# Patient Record
Sex: FEMALE | Race: WHITE
[De-identification: names, ages, dates, MRNs, and addresses within clinical notes are randomized per-mention and may not be internally consistent; named-entity substitution may affect disease eponyms.]

---

## 2018-04-18 ENCOUNTER — HOSPITAL ENCOUNTER (EMERGENCY)
Dept: HOSPITAL 91 - FTE | Age: 28
Discharge: HOME | End: 2018-04-18
Payer: SELF-PAY

## 2018-04-18 ENCOUNTER — HOSPITAL ENCOUNTER (EMERGENCY)
Age: 28
Discharge: HOME | End: 2018-04-18

## 2018-04-18 DIAGNOSIS — R05: Primary | ICD-10-CM

## 2018-04-18 PROCEDURE — 99283 EMERGENCY DEPT VISIT LOW MDM: CPT

## 2018-04-18 PROCEDURE — 71045 X-RAY EXAM CHEST 1 VIEW: CPT

## 2019-04-18 ENCOUNTER — HOSPITAL ENCOUNTER (EMERGENCY)
Dept: HOSPITAL 10 - FTE | Age: 29
Discharge: HOME | End: 2019-04-18
Payer: MEDICAID

## 2019-04-18 ENCOUNTER — HOSPITAL ENCOUNTER (EMERGENCY)
Dept: HOSPITAL 91 - FTE | Age: 29
Discharge: HOME | End: 2019-04-18
Payer: MEDICAID

## 2019-04-18 VITALS — DIASTOLIC BLOOD PRESSURE: 69 MMHG | HEART RATE: 90 BPM | SYSTOLIC BLOOD PRESSURE: 117 MMHG | RESPIRATION RATE: 18 BRPM

## 2019-04-18 VITALS
HEIGHT: 64 IN | WEIGHT: 189.6 LBS | WEIGHT: 189.6 LBS | BODY MASS INDEX: 32.37 KG/M2 | BODY MASS INDEX: 32.37 KG/M2 | HEIGHT: 64 IN

## 2019-04-18 DIAGNOSIS — K11.20: Primary | ICD-10-CM

## 2019-04-18 PROCEDURE — 96375 TX/PRO/DX INJ NEW DRUG ADDON: CPT

## 2019-04-18 PROCEDURE — 96374 THER/PROPH/DIAG INJ IV PUSH: CPT

## 2019-04-18 PROCEDURE — 81025 URINE PREGNANCY TEST: CPT

## 2019-04-18 PROCEDURE — 99284 EMERGENCY DEPT VISIT MOD MDM: CPT

## 2019-04-18 RX ADMIN — CEFTRIAXONE 1 MLS/HR: 2 INJECTION, SOLUTION INTRAVENOUS at 12:16

## 2019-04-18 RX ADMIN — ACETAMINOPHEN 1 MG: 160 SUSPENSION ORAL at 11:53

## 2019-04-18 RX ADMIN — MORPHINE SULFATE 1 MG: 2 INJECTION, SOLUTION INTRAMUSCULAR; INTRAVENOUS at 13:08

## 2019-04-18 RX ADMIN — THIAMINE HYDROCHLORIDE 1 MLS/HR: 100 INJECTION, SOLUTION INTRAMUSCULAR; INTRAVENOUS at 11:52

## 2019-04-18 RX ADMIN — KETOROLAC TROMETHAMINE 1 MG: 15 INJECTION, SOLUTION INTRAMUSCULAR; INTRAVENOUS at 11:52

## 2019-04-18 RX ADMIN — METHYLPREDNISOLONE SODIUM SUCCINATE 1 MG: 125 INJECTION, POWDER, FOR SOLUTION INTRAMUSCULAR; INTRAVENOUS at 11:52

## 2019-04-18 NOTE — ERD
ER Documentation


Chief Complaint


Chief Complaint





C/O COUGH, SORE THROAT, DIFFICULTY TO SWALLOW FOR 3 DAYS





HPI


28-year-old female, presents to the emergency department, complaining of 3days 


with sore throat, associated with difficulty to swallow solids, subjective fever


and general malaise.  The patient also noticed a tender lump under the neck.





ROS


All systems reviewed and are negative except as per history of present illness.





Medications


Home Meds


Active Scripts


Hydrocodone/Acetaminophen (Norco 5-325 Tablet) 1 Each Tablet, 1 TAB PO Q6H PRN 


for PAIN, #7 TAB


   Prov:HOLLEY HENRY PA-C         4/20/19


Ibuprofen* (Motrin*) 400 Mg Tab, 400 MG PO Q6H PRN for PAIN AND OR ELEVATED 


TEMP, #30 TAB


   Prov:DOROTEO BROWNING MD         4/18/19


Amoxicillin/Potassium Clav (Amox-Clav 875-125 mg Tablet) 875-125 mg Tab, 1 TAB 


PO BID, #20 TAB


   Prov:DOROTEO BROWNING MD         4/18/19


Dextromethorphan Hb-Promethazine Hcl* (Promethazine DM* Syrup) 473 Ml Syrup, 5 


ML PO Q6 PRN for COUGH, #120 ML


   Prov:DEMETRIUS VOGEL PA-C         4/18/18





Allergies


Allergies:  


Coded Allergies:  


     No Known Allergy (Unverified , 4/20/19)





PMhx/Soc


Medical and Surgical Hx:  pt denies Medical Hx, pt denies Surgical Hx


Hx Substance Use:  No


Hx Tobacco Use:  No


Smoking Status:  Never smoker





FmHx


Family History:  No diabetes, No coronary disease





Physical Exam


Vitals





Vital Signs


  Date      Temp  Pulse  Resp  B/P (MAP)   Pulse Ox  O2          O2 Flow    FiO2


Time                                                 Delivery    Rate


   4/18/19  99.2     90    18      117/69        97  Room Air


     14:16                           (85)


   4/18/19  98.5     90    18      134/79        99


     10:43                           (97)





Physical Exam


Patient is in moderate distress due to pain.  Vital signs stable.


EYES: PERRLA, EOMI, injected sclerae 


EARS: Canals clear, erythematous tympanic membranes


THROAT: Erythematous oropharynx with significant tenderness and edema of the s


ubmandibular salivary glands


NECK: Supple, + tender cervical lymphadenopathy. Full ROM without pain or 


tenderness.


HEART:  RRR, no rubs, murmurs, clicks or gallops.


LUNGS: Bilateral rhonchi to auscultation.


ABDOMEN: Soft, non-tender without masses or hepatosplenomegaly.


EXTREMITIES: No edema bilaterally.


BACK: Full ROM, no deformity, normal back exam


NEURO: Cranial nerves grossly intact, no motor or sensory deficit


Results 24 hrs





Laboratory Tests


                    Test
                      4/18/19
11:23


                    POC Beta HCG, Qualitative  NEGATIVE





Current Medications


 Medications
   Dose
          Sig/Brody
       Start Time
   Status  Last


 (Trade)       Ordered        Route
 PRN     Stop Time              Admin
Dose


                              Reason                                Admin


 Sodium         1,000 ml @ 
   Q1H STAT
      4/18/19       DC           4/18/19


Chloride       1,000 mls/hr   IV
            11:17
                       11:52



                                             4/18/19 12:16


                125 mg         ONCE  ONCE
    4/18/19       DC           4/18/19


Methylprednis                 IV
            11:30
                       11:52



olone
 Sodium                                4/18/19 11:31


Succinate



(Solu-Medrol)


 Ceftriaxone    50 ml @ 
      ONCE  ONCE
    4/18/19       DC           4/18/19


Sodium         100 mls/hr     IVPB
          11:30
                       12:16



                                             4/18/19 11:59


 Ketorolac
     15 mg          ONCE  STAT
    4/18/19       DC           4/18/19


Tromethamine
                 IV
            11:17
                       11:52



 (Toradol)                                   4/18/19 11:29


                320 mg         ONCE  ONCE
    4/18/19       DC           4/18/19


Acetaminophen                 PO
            11:30
                       11:53




  (Tylenol                                  4/18/19 11:31


Liquid



(Ped))


 Morphine       1 mg           ONCE  STAT
    4/18/19       DC           4/18/19


Sulfate
                      IV
            12:20
                       13:08



(morphine)                                   4/18/19 12:21








Procedures/MDM


Differential diagnosis include but not limited to: Tonsillar/pharyngeal 


infection bacterial/viral/fungal, parotitis, allergies, GERD.  Less likely 


peritonsillar abscess, retropharyngeal abscess.  No signs of upper respiratory 


obstruction


Physical examination and clinical presentation consistent most likely with 


infectious parotitis.


During the ED course the patient remained stable.


Clinical impression discussed with the patient who agrees with management. The 


patient is stable to be treated outpatient and will be discharged home with a Rx


for antibiotic and ibuprofen.


Some side effects of prescribed medications (headache, rash, nausea, vomiting, 


diarrhea, drowsiness, habituation, bleeding, hypertension, interactions with ot


her medications) were reviewed.





The patient was instructed to follow up with the primary care provider in the 


next 48h.  If symptoms persist, worsen or new symptoms develop, then patient 


should return to the ED immediately.





Disclaimer: Inadvertent spelling and grammatical errors are likely due to 


EHR/dictation software use and do not reflect on the overall quality of patient 


care. Also, please note that the electronic time recorded on this note does not 


necessarily reflect the actual time of the patient encounter.





Departure


Diagnosis:  


   Primary Impression:  


   Salivary gland infection


Condition:  Stable





Additional Instructions:  


Muchas desiree por [facility] para fraga servicio.





Esperamos que en fraga visita a la byron de emergencia fraga problema medico haya sido 


solucionado y que se sienta mucho mejor. 





Para estar seguros que fraga mejoria sigue en proceso, le pedimos el favor de hacer


sabas radha de seguimiento medico con fraga doctor primario en los proximos 2-4 hickey.





Lleve con usted estos documentos y las medicinas recetadas.





Si jaspal sintomas empeoran, NO SE ESPERE, por favor regrese a byron de emergencia 


INMEDIATAMENTE.





En mak que usted no tenga un mdico de atencin primaria:


Llame al mdico o clnica comunitaria de referencia que aparece abajo radha 


las horas de consultorio para hacer sabas radha para que le vean.





CLINICAS:


[facility]  [phone] [address] [facility]  [phone] [address] [facility] [phone]


[address] [facility]  [phone]


[address] [facility]   [phone] [address] [facility]. [phone] 


[address] DOROTEO LOERA MD      Apr 18, 2019 10:51

## 2019-04-20 ENCOUNTER — HOSPITAL ENCOUNTER (EMERGENCY)
Dept: HOSPITAL 10 - FTE | Age: 29
Discharge: HOME | End: 2019-04-20
Payer: MEDICAID

## 2019-04-20 ENCOUNTER — HOSPITAL ENCOUNTER (EMERGENCY)
Dept: HOSPITAL 91 - FTE | Age: 29
Discharge: HOME | End: 2019-04-20
Payer: MEDICAID

## 2019-04-20 VITALS
HEIGHT: 63 IN | BODY MASS INDEX: 33.75 KG/M2 | WEIGHT: 190.48 LBS | WEIGHT: 190.48 LBS | BODY MASS INDEX: 33.75 KG/M2 | HEIGHT: 63 IN

## 2019-04-20 VITALS — RESPIRATION RATE: 18 BRPM | DIASTOLIC BLOOD PRESSURE: 76 MMHG | SYSTOLIC BLOOD PRESSURE: 143 MMHG | HEART RATE: 72 BPM

## 2019-04-20 DIAGNOSIS — K11.8: Primary | ICD-10-CM

## 2019-04-20 LAB
ADD MAN DIFF?: NO
ANION GAP: 7 (ref 5–13)
BASOPHIL #: 0 10^3/UL (ref 0–0.1)
BASOPHILS %: 0.4 % (ref 0–2)
BLOOD UREA NITROGEN: 8 MG/DL (ref 7–20)
CALCIUM: 10.1 MG/DL (ref 8.4–10.2)
CARBON DIOXIDE: 33 MMOL/L (ref 21–31)
CHLORIDE: 99 MMOL/L (ref 97–110)
CREATININE: 0.62 MG/DL (ref 0.44–1)
EOSINOPHILS #: 0.2 10^3/UL (ref 0–0.5)
EOSINOPHILS %: 2.3 % (ref 0–7)
GLUCOSE: 97 MG/DL (ref 70–220)
HEMATOCRIT: 40.6 % (ref 37–47)
HEMOGLOBIN: 13.6 G/DL (ref 12–16)
IMMATURE GRANS #M: 0.13 10^3/UL (ref 0–0.03)
IMMATURE GRANS % (M): 1.5 % (ref 0–0.43)
LYMPHOCYTES #: 2.4 10^3/UL (ref 0.8–2.9)
LYMPHOCYTES %: 28.1 % (ref 15–51)
MEAN CORPUSCULAR HEMOGLOBIN: 28.3 PG (ref 29–33)
MEAN CORPUSCULAR HGB CONC: 33.5 G/DL (ref 32–37)
MEAN CORPUSCULAR VOLUME: 84.4 FL (ref 82–101)
MEAN PLATELET VOLUME: 9.9 FL (ref 7.4–10.4)
MONOCYTE #: 0.4 10^3/UL (ref 0.3–0.9)
MONOCYTES %: 5 % (ref 0–11)
NEUTROPHIL #: 5.3 10^3/UL (ref 1.6–7.5)
NEUTROPHILS %: 62.7 % (ref 39–77)
NUCLEATED RED BLOOD CELLS #: 0 10^3/UL (ref 0–0)
NUCLEATED RED BLOOD CELLS%: 0 /100WBC (ref 0–0)
PLATELET COUNT: 322 10^3/UL (ref 140–415)
POTASSIUM: 4.3 MMOL/L (ref 3.5–5.1)
RED BLOOD COUNT: 4.81 10^6/UL (ref 4.2–5.4)
RED CELL DISTRIBUTION WIDTH: 12 % (ref 11.5–14.5)
SODIUM: 139 MMOL/L (ref 135–144)
WHITE BLOOD COUNT: 8.5 10^3/UL (ref 4.8–10.8)

## 2019-04-20 PROCEDURE — 84702 CHORIONIC GONADOTROPIN TEST: CPT

## 2019-04-20 PROCEDURE — 80048 BASIC METABOLIC PNL TOTAL CA: CPT

## 2019-04-20 PROCEDURE — 81025 URINE PREGNANCY TEST: CPT

## 2019-04-20 PROCEDURE — 70486 CT MAXILLOFACIAL W/O DYE: CPT

## 2019-04-20 PROCEDURE — 85025 COMPLETE CBC W/AUTO DIFF WBC: CPT

## 2019-04-20 PROCEDURE — 99285 EMERGENCY DEPT VISIT HI MDM: CPT

## 2019-04-20 PROCEDURE — 96374 THER/PROPH/DIAG INJ IV PUSH: CPT

## 2019-04-20 RX ADMIN — KETOROLAC TROMETHAMINE 1 MG: 30 INJECTION, SOLUTION INTRAMUSCULAR at 16:28

## 2019-04-20 RX ADMIN — VASOPRESSIN 1 ML/2 SEC: 20 INJECTION, SOLUTION INTRAMUSCULAR; SUBCUTANEOUS at 16:09

## 2019-04-20 RX ADMIN — IOHEXOL 1 ML: 300 INJECTION, SOLUTION INTRAVENOUS at 16:09

## 2019-04-20 RX ADMIN — SODIUM CHLORIDE 1 ML: 9 INJECTION, SOLUTION INTRAMUSCULAR; INTRAVENOUS; SUBCUTANEOUS at 16:09

## 2019-04-20 NOTE — ERD
ER Documentation


Chief Complaint


Chief Complaint





PT HAS ST X 4 DAYS 6/10





HPI


Patient is a 28-year-old female presents the ER for concerns of throat pain 


times 5 days.  Patient was seen here 2 days ago and diagnosed as tolerated and 


infection.  Patient states his been taking  Augmentin and ibuprofen with minimal


alleviation of symptoms.  Patient states she continues to have more swelling 


below her tongue and pain.  Patient states it hurts to swallow.  Patient reports


tactile fevers.  Patient denies any drooling, trismus or hyperextension of her 


neck.  Patient denies any cough.  Patient denies any chest pain or shortness of 


breath.





ROS


All systems reviewed and are negative except as per history of present illness.





Medications


Home Meds


Active Scripts


Hydrocodone/Acetaminophen (Norco 5-325 Tablet) 1 Each Tablet, 1 TAB PO Q6H PRN 


for PAIN, #7 TAB


   Prov:HOLLEY HENRY PA-C         19


Ibuprofen* (Motrin*) 400 Mg Tab, 400 MG PO Q6H PRN for PAIN AND OR ELEVATED 


TEMP, #30 TAB


   Prov:DOROTEO BROWNING MD         19


Amoxicillin/Potassium Clav (Amox-Clav 875-125 mg Tablet) 875-125 mg Tab, 1 TAB 


PO BID, #20 TAB


   Prov:DOROTEO BROWNING MD         19


Dextromethorphan Hb-Promethazine Hcl* (Promethazine DM* Syrup) 473 Ml Syrup, 5 


ML PO Q6 PRN for COUGH, #120 ML


   Prov:DEMETRIUS VOGEL PA-C         18





Allergies


Allergies:  


Coded Allergies:  


     No Known Allergy (Unverified , 19)





PMhx/Soc


Medical and Surgical Hx:  pt denies Medical Hx, pt denies Surgical Hx


Hx Alcohol Use:  No


Hx Substance Use:  No


Hx Tobacco Use:  No


Smoking Status:  Never smoker





FmHx


Family History:  No diabetes





Physical Exam


Vitals





Vital Signs


  Date      Temp  Pulse  Resp  B/P (MAP)   Pulse Ox  O2          O2 Flow    FiO2


Time                                                 Delivery    Rate


   19  98.4     72    18      143/76       100


     12:47                           (98)





Physical Exam


GENERAL: Well-developed, well-nourished female. Appears in no acute distress.  


Speaking in full sentences.


HEAD: Normocephalic, atraumatic. 


EYES: Pupils are equally reactive bilaterally. EOMs grossly intact. No 


conjunctival erythema. 


ENT: Moist mucous membranes. No uvula deviation. No kissing tonsils.  Oropharynx


is erythematous, no exudates noted.  No tonsillar swelling noted.  Swelling 


noted in this sub-mandibular region.  Area is tender to touch however no 


erythema or warmth noted.


NECK: Supple. No meningismus. Normal range of motion of the neck.


LUNG: Clear to auscultation bilaterally. No rhonchi, wheezing, rales or coarse 


breath sounds. 


HEART: Regular rate and rhythm. No murmurs, rubs or gallops.


EXTREMITIES: Equal pulses bilaterally. No peripheral clubbing, cyanosis or 


edema. No unilateral leg swelling.


NEUROLOGIC: Alert and oriented. Moving all four extremities without any difficu


lty. Normal speech. Steady gait. 


SKIN: Normal color. Warm and dry. No rashes or lesions.


Result Diagram:  


19 1418                                                                    


           19 1418





Results 24 hrs





Laboratory Tests


       Test
                                 19
14:18  19
14:20


       White Blood Count                      8.5 10^3/ul


       Red Blood Count                       4.81 10^6/ul


       Hemoglobin                               13.6 g/dl


       Hematocrit                                  40.6 %


       Mean Corpuscular Volume                    84.4 fl


       Mean Corpuscular Hemoglobin                28.3 pg


       Mean Corpuscular Hemoglobin
Concent     33.5 g/dl 
  



       Red Cell Distribution Width                 12.0 %


       Platelet Count                         322 10^3/UL


       Mean Platelet Volume                        9.9 fl


       Immature Granulocytes %                    1.500 %


       Neutrophils %                               62.7 %


       Lymphocytes %                               28.1 %


       Monocytes %                                  5.0 %


       Eosinophils %                                2.3 %


       Basophils %                                  0.4 %


       Nucleated Red Blood Cells %            0.0 /100WBC


       Immature Granulocytes #              0.130 10^3/ul


       Neutrophils #                          5.3 10^3/ul


       Lymphocytes #                          2.4 10^3/ul


       Monocytes #                            0.4 10^3/ul


       Eosinophils #                          0.2 10^3/ul


       Basophils #                            0.0 10^3/ul


       Nucleated Red Blood Cells #            0.0 10^3/ul


       Sodium Level                            139 mmol/L


       Potassium Level                         4.3 mmol/L


       Chloride Level                           99 mmol/L


       Carbon Dioxide Level                     33 mmol/L


       Anion Gap                                        7


       Blood Urea Nitrogen                        8 mg/dl


       Creatinine                              0.62 mg/dl


       Est Glomerular Filtrat Rate
mL/min   > 60 mL/min 
   



       Glucose Level                             97 mg/dl


       Calcium Level                           10.1 mg/dl


       Beta HCG, Quantitative               < 2.4 mIU/ml


       POC Beta HCG, Qualitative                            BORDERLINE





Current Medications


 Medications
   Dose
          Sig/Brody
       Start Time
   Status  Last


 (Trade)       Ordered        Route
 PRN     Stop Time              Admin
Dose


                              Reason                                Admin


                1 tab          ONCE  ONCE
    19       DC       



Acetaminophen                 PO
            16:00



/
                                           19 16:00


Hydrocodone


Bitart



(Norco


(5/325))


 Ketorolac
     30 mg          ONCE  STAT
    19       DC           19


Tromethamine
                 IV
            15:42
                       16:28



 (Toradol)                                   19 15:43


 IV Flush
      10 ml          STK-MED        19       DC           19


(NS 10 ml)                    ONCE
 .ROUTE
  15:50
                       16:09



                                             19 15:51


 Sodium         100 ml @ ud    STK-MED        19       DC           19


Chloride                      ONCE
 .ROUTE
  15:50
                       16:09



                                             19 15:51


 Iohexol
       150 ml         STK-MED        19       DC           19


(Omnipaque                    ONCE
 .ROUTE
  15:50
                       16:09



300mg/
 ml)                                  19 15:51








Procedures/MDM


ED COURSE:


The patient was stable throughout ED course. I kept the patient and/or family 


informed of laboratory and diagnostic imaging results throughout the ED course. 








 


DIAGNOSTIC IMAGING:


Read by radiologist.


Patient: SHAW CASTRO   : 1990   Age: 28  Sex: F        


               


       MR #:    [id number]   Acct #:   [id number]    DOS: 19 1410


Ordering MD: HOLLEY HENRY PA-C   Location:  [facility]   Room/Bed:                   


                        


                                        


PROCEDURE:  CT NECK WITH CONTRAST


 


CLINICAL INDICATION:  Floor of the mouth lesion.


 


TECHNIQUE:  Utilizing a GE LightSpeMobbles helical CT scanner, multiple contiguous 


transaxial images were obtained from the base of skull to mid thyroid gland 


during intravenous administration of 80 cc of Omnipaque 300. In addition to soft


tissue and bone windows of transaxial images, multiple sagittal and coronal 


reformatted images were generated for the interpretation.


 


DIACOM images are available.


 


Radiation dose: CTDIvol = 54 mGy; total DLP = 1168 mGy-cm.


 


One or more of the following dose reduction techniques were used:


- Automated exposure control.


- Adjustment of the mA and/or kV according to patient size.


- Use of iterative reconstruction technique.


  


COMPARISON:  None. 


 


FINDINGS:


There is a bilobed lesion with a central hypoattenuation density and with mildly


thick peripheral soft tissue with mild contrast enhancement along the midline 


anteriorly and left paramedian posteriorly of floor of the mouth measuring 


approximately 37 mm AP x 18 mm TR x 20 mm CC and along the midline of infrahyoid


strap muscles measuring approximately 9 mm x 13 mm TR x 20 mm CC, which could 


represent a previously infected thyroglossal duct cyst or dermoid cyst. MRI 


examination of the neck with and without contrast is recommended for further 


tissue characterization.


 


The nasopharynx, oropharynx, hypopharynx, bilateral parotid glands, bilateral 


submandibular glands, and visualized thyroid gland are normal. There is no 


malignant-appearing cervical lymphadenopathy per the size criteria.


 


There is no destructive bone lesion. Bilateral mastoid air cells and visualized 


paranasal sinuses are normally aerated.


 


IMPRESSION:


 


1.  Bilobed lesion with a central hypoattenuation density and with mildly thick 


peripheral soft tissue with mild contrast enhancement along the midline 


anteriorly and left paramedian posteriorly of floor of the mouth measuring 


approximately 37 mm AP x 18 mm TR x 20 mm CC and along the midline of infrahyoid


strap muscles measuring approximately 9 mm x 13 mm TR x 20 mm CC could represent


a previously infected thyroglossal duct cyst or dermoid cyst. MRI examination of


the neck with and without contrast is recommended for further tissue 


characterization.


2,  No malignant appearing cervical lymphadenopathy per the size criteria.


 


RPTAT: EE


_____________________________________________ 


Physician Miri           Date    Time 


Electronically viewed and signed by Physician Miri on 2019 17:13 


 


D:  2019 17:13  T:  2019 17:13


PH/





CC: HOLLEY HENRY PA-C





[phone]


 








MEDICAL DECISION MAKING:


Patient is a 28-year-old female presents ER for concerns of throat pain for the 


last 5 days.  Patient was seen here 2 days ago and given Augmentin for concerns 


of a salivary gland infection.  Patient states her swelling and pain has 


worsened.  Vital signs were reviewed. Patient is afebrile. Patient was not 


hypoxic. Patient was hemodynamically stable.  Patient was tolerating secretions 


well with no evidence of airway compromise.





IV line was established.  Blood work was obtained.  Initial urine beta-hCG was 


noted to be borderline thus quantitative beta-hCG through blood was obtained and


was less than 2.4. CBC showed no evidence of systemic infection or severe 


anemia. BMP showed no evidence of electrolyte abnormalities, severe acidosis, 


alkalosis, renal failure.





CT imaging with IV contrast of mandible obtained and showed:


1.  Bilobed lesion with a central hypoattenuation density and with mildly thick 


peripheral soft tissue with mild contrast enhancement along the midline 


anteriorly and left paramedian posteriorly of floor of the mouth measuring 


approximately 37 mm AP x 18 mm TR x 20 mm CC and along the midline of infrahyoid


strap muscles measuring approximately 9 mm x 13 mm TR x 20 mm CC could represent


a previously infected thyroglossal duct cyst or dermoid cyst. MRI examination of


the neck with and without contrast is recommended for further tissue 


characterization.


2,  No malignant appearing cervical lymphadenopathy per the size criteria.





Findings were discussed with supervising physician Dr. Steiner, who agreed that 


the patient was stable for outpatient management.  Patient was given ENT 


referral for patient advised to follow-up with an ENT specialist.  Patient will 


likely need further work-up and/or biopsy of the affected mass. 





At this time the patient presentation is most consistent with a submandibular 


mass.  Low suspicion for airway compromise, airway obstruction, deep space 


infection, sepsis. Patient was nontoxic, non ill appearing prior to discharge. 


Patient advised to continue antibiotics as prescribed. 





Disclaimer: Inadvertent spelling and grammatical errors are likely due to 


EHR/dictation software use and do not reflect on the overall quality of patient 


care. Also, please note that the electronic time recorded on this note does not 


necessarily reflect the actual time of the patient encounter.





Departure


Diagnosis:  


   Primary Impression:  


   Submandibular gland mass


Condition:  Fair


Patient Instructions:  Salivary Gland Infection


Referrals:  


SHE RAMOS MD,ALE MATTHEWS,MARIE BROTHERS MD, MD





[facility]


YOU HAVE RECEIVED A MEDICAL SCREENING EXAM AND THE RESULTS INDICATE THAT YOU DO 


NOT HAVE A CONDITION THAT REQUIRES URGENT TREATMENT IN THE EMERGENCY DEPARTMENT.





FURTHER EVALUATION AND TREATMENT OF YOUR CONDITION CAN WAIT UNTIL YOU ARE SEEN 


IN YOUR DOCTORS OFFICE WITHIN THE NEXT 1-2 DAYS. IT IS YOUR RESPONSIBILITY TO 


MAKE AN APPOINTMENT FOR FOLOW-UP CARE.





IF YOU HAVE A PRIMARY DOCTOR


--you should call your primary doctor and schedule an appointment





IF YOU DO NOT HAVE A PRIMARY DOCTOR YOU CAN CALL OUR PHYSICIAN REFERRAL HOTLINE 


AT


 [phone] 





IF YOU CAN NOT AFFORD TO SEE A PHYSICIAN YOU CAN CHOSE FROM THE FOLLOWING 


[facility] [phone] [address] [facility]





[facility] [phone] [address] [facility]. [facility] [phone] [address] [facility] [phone] [address] [facility] [phone] [address] [facility]. [facility] [phone] [address] [facility]. [facility]


YOU HAVE RECEIVED A MEDICAL SCREENING EXAM AND THE RESULTS INDICATE THAT YOU DO 


NOT HAVE A CONDITION THAT REQUIRES URGENT TREATMENT IN THE EMERGENCY DEPARTMENT.





FURTHER EVALUATION AND TREATMENT OF YOUR CONDITION CAN WAIT UNTIL YOU ARE SEEN 


IN YOUR DOCTORS OFFICE WITHIN THE NEXT 1-2 DAYS. IT IS YOUR RESPONSIBILITY TO 


MAKE AN APPOINTMENT FOR FOLOW-UP CARE.





IF YOU HAVE A PRIMARY DOCTOR


--you should call your primary doctor and schedule and appointment





IF YOU DO NOT HAVE A PRIMARY DOCTOR YOU CAN CALL OUR PHYSICIAN REFERRAL HOTLINE 


AT [phone].





IF YOU CAN NOT AFFORD TO SEE A PHYSICIAN YOU CAN CHOSE FROM THE FOLLOWING [facility]:





[facility]


[address] [place] [address]





[facility]


[address] [place] [address]





[facility]


[address] [place] [address]





Additional Instructions:  


Continuar los antibiticos segn lo prescrito. No tome Norco cuando conduzca o 


maneje maquinaria. Kelsie un seguimiento con el mdico ENT para un tratamiento 


adicional de los sntomas.





Llame al doctor MAANA y kelsie sabas JOS PARA DENTRO DE 1-2 HE.Dgale a la 


secretaria que nosotros le instruimos hacer esta jos.Avise o llame si fraga 


condicin se empeora antes de la jos. Regresa aqui si peor o no mejor.











HOLLEY HENRY PA-C             2019 15:39

## 2019-06-18 ENCOUNTER — HOSPITAL ENCOUNTER (EMERGENCY)
Dept: HOSPITAL 10 - FTE | Age: 29
Discharge: HOME | End: 2019-06-18
Payer: MEDICAID

## 2019-06-18 ENCOUNTER — HOSPITAL ENCOUNTER (EMERGENCY)
Dept: HOSPITAL 91 - FTE | Age: 29
Discharge: HOME | End: 2019-06-18
Payer: MEDICAID

## 2019-06-18 VITALS — DIASTOLIC BLOOD PRESSURE: 87 MMHG | SYSTOLIC BLOOD PRESSURE: 150 MMHG | RESPIRATION RATE: 18 BRPM | HEART RATE: 75 BPM

## 2019-06-18 VITALS
WEIGHT: 186.95 LBS | HEIGHT: 66 IN | BODY MASS INDEX: 30.05 KG/M2 | BODY MASS INDEX: 30.05 KG/M2 | HEIGHT: 66 IN | WEIGHT: 186.95 LBS

## 2019-06-18 DIAGNOSIS — F41.9: ICD-10-CM

## 2019-06-18 DIAGNOSIS — B34.9: Primary | ICD-10-CM

## 2019-06-18 PROCEDURE — 71046 X-RAY EXAM CHEST 2 VIEWS: CPT

## 2019-06-18 PROCEDURE — 93005 ELECTROCARDIOGRAM TRACING: CPT

## 2019-06-18 PROCEDURE — 99284 EMERGENCY DEPT VISIT MOD MDM: CPT

## 2019-06-18 PROCEDURE — 81025 URINE PREGNANCY TEST: CPT

## 2019-06-18 NOTE — ERD
ER Documentation


Chief Complaint


Chief Complaint





shortness of breath and sorethroat since yesterday





HPI


28-year-old female, previously healthy, presents to the emergency department, 


complaining of cough and sore throat that started yesterday.  The patient 


reports today mild shortness of breath and chest pain and she is concerned about


her heart and lungs.  No history of previous similar episodes, no history of 


asthma or pulmonary disease, no history of coronary events.  The patient denies 


fever or chills, no abdominal pain, no rashes.





ROS


All systems reviewed and are negative except as per history of present illness.





Medications


Home Meds


Active Scripts


Ibuprofen* (Motrin*) 400 Mg Tab, 400 MG PO Q6H PRN for PAIN AND OR ELEVATED 


TEMP, #20 TAB


   Prov:DOROTEO BROWNING MD         19


Lorazepam* (Ativan*) 0.5 Mg Tablet, 0.5 MG PO DAILY PRN for ANXIETY, #5 TAB


   Prov:DOROTEO BROWNING MD         19


Hydrocodone/Acetaminophen (Norco 5-325 Tablet) 1 Each Tablet, 1 TAB PO Q6H PRN 


for PAIN, #7 TAB


   Prov:HOLLEY HENRY PA-C         19


Ibuprofen* (Motrin*) 400 Mg Tab, 400 MG PO Q6H PRN for PAIN AND OR ELEVATED 


TEMP, #30 TAB


   Prov:DOROTEO BROWNING MD         19


Amoxicillin/Potassium Clav (Amox-Clav 875-125 mg Tablet) 875-125 mg Tab, 1 TAB 


PO BID, #20 TAB


   Prov:DOROTEO BROWNING MD         19


Dextromethorphan Hb-Promethazine Hcl* (Promethazine DM* Syrup) 473 Ml Syrup, 5 


ML PO Q6 PRN for COUGH, #120 ML


   Prov:DEMETRIUS VOGEL PA-C         18





Allergies


Allergies:  


Coded Allergies:  


     No Known Allergy (Unverified , 19)





PMhx/Soc


Medical and Surgical Hx:  pt denies Medical Hx


Hx Alcohol Use:  No


Hx Substance Use:  No


Hx Tobacco Use:  No





FmHx


Family History:  diabetes, coronary disease





Physical Exam


Vitals





Vital Signs


  Date      Temp  Pulse  Resp  B/P (MAP)   Pulse Ox  O2          O2 Flow    FiO2


Time                                                 Delivery    Rate


   19  99.0     75    18      150/87        98


     14:46                          (108)





Physical Exam


Const:   No acute distress


Head:   Atraumatic 


Eyes:    Normal Conjunctiva


ENT:    Normal External Ears, Nose and Mouth.


Neck:               Full range of motion. No meningismus.


Resp:   Clear to auscultation bilaterally


Cardio:   Regular rate and rhythm, no murmurs


Abd:    Soft, non tender, non distended. Normal bowel sounds


Skin:   No petechiae or rashes


Back:   No midline or flank tenderness


Ext:    No cyanosis, or edema


Neur:   Awake and alert


Psych:    Normal Mood and Affect


Results 24 hrs





Laboratory Tests


                    Test
                      19
16:19


                    POC Beta HCG, Qualitative  NEGATIVE





Patient: SHAW CASTRO   : 1990   Age: 28  Sex: F        


               


MR #:    [id number]   Acct #:   [id number]    DOS: 19 1559


Ordering MD: DOROTEO BROWNING MD   Location:  [facility]   Room/Bed:            


                               





PROCEDURE:   XR Chest. 


 


CLINICAL INDICATION:   Shortness of breath


 


TECHNIQUE:   PA and lateral views of the chest were obtained 


 


COMPARISON:   2018


 


FINDINGS:


Heart is normal in size.


Mediastinum is unremarkable.


The lungs are clear.


There is no pleural effusion or pneumothorax.  


No acute osseous abnormality. 


 


IMPRESSION:


No acute pulmonary disease.  


 


RPTAT: PP


_____________________________________________ 


Physician Russel           Date    Time 


Electronically viewed and signed by Theron Fall Physician on 2019 


17:02 


 


D:  2019 17:02  T:  2019 17:02


ME/





CC: DOROTEO BRWONING MD





[id number]


EKG read by me: 


Rate/Rhythm: Regular rate and rhythm at a rate of 61


Intervals: Normal


No acute ST changes.  No T wave inversion


Impression:     No evidence of acute ischemia or arrhythmia





Procedures/MDM


Vital signs stable. Differential diagnosis include but not limited to: URI, PNA,


chostochondritis, GERD, musculoskeletal injury, less likely PE, pericarditis, 


endocarditis.


Pertinent Data: 


12 Lead ECG: Sinus rhythm, no ST changes, normal T wave, normal intervals


Radiology: Chest x-rays: Normal


Physical examination and clinical presentation consistent most likely with 


atypical chest pain most likely secondary to anxiety/viral syndrome due to sore 


throat.


During the ED course the patient remained stable, no new complaints. 


Results and clinical impression discussed with patient who agrees with 


management. The patient is stable to be treated outpatient and will be 


discharged home. some side effects of prescribed medications (headache, rash, 


nausea, vomiting, diarrhea, drowsiness, habituation, bleeding, hypertension, 


interactions with other medications) were reviewed.





The patient was informed that the evaluation in the emergency department has 


been done to rule out an acute emergency, therefore, chronic conditions like 


malignancy or autoimmune diseases have not been evaluated; therefore, the 


patient was instructed to follow up with the primary care provider in the next 


48h.  If symptoms persist, worsen or new symptoms develop, then patient should 


return to the ED immediately.





Instructions explained and given directly by me to the patient with 


acknowledgment and demonstrated understanding.





Disclaimer: Inadvertent spelling and grammatical errors are likely due to 


EHR/dictation software use and do not reflect on the overall quality of patient 


care. Also, please note that the electronic time recorded on this note does not 


necessarily reflect the actual time of the patient encounter.





Departure


Diagnosis:  


   Primary Impression:  


   Viral syndrome


   Additional Impression:  


   Anxiety


Condition:  Stable


Patient Instructions:  Your Body's Response to Anxiety





Additional Instructions:  


Muchas desiree por [facility] para fraga servicio.





Esperamos que en fraga visita a la byron de emergencia fraga problema medico haya sido 


solucionado y que se sienta mucho mejor. 





Para estar seguros que fraga mejoria sigue en proceso, le pedimos el favor de hacer


sabas radha de seguimiento medico con fraga doctor primario en los proximos 2-4 hickey.





Lleve con usted estos documentos y las medicinas recetadas.





Si jaspal sintomas empeoran, NO SE ESPERE, por favor regrese a byron de emergencia 


INMEDIATAMENTE.





En mak que usted no tenga un mdico de atencin primaria:


Llame al mdico o clnica comunitaria de referencia que aparece abajo radha 


las horas de consultorio para hacer sabas radha para que le vean.





CLINICAS:


[facility]  [phone] [facility]  [phone] [address] [facility] [phone] [address] [facility]  [phone] [address] [place] [id number] [phone] [facility]. [phone] 


[address] DOROTEO LOERA MD      2019 15:50